# Patient Record
Sex: FEMALE | Race: OTHER | Employment: FULL TIME | URBAN - METROPOLITAN AREA
[De-identification: names, ages, dates, MRNs, and addresses within clinical notes are randomized per-mention and may not be internally consistent; named-entity substitution may affect disease eponyms.]

---

## 2022-09-12 LAB
ANTIBODY SCREEN, EXTERNAL: NEGATIVE
CHLAMYDIA, EXTERNAL: NEGATIVE
HBSAG, EXTERNAL: NEGATIVE
HEPATITIS C AB,   EXT: NEGATIVE
HIV, EXTERNAL: NON REACTIVE
N. GONORRHEA, EXTERNAL: NEGATIVE
RUBELLA, EXTERNAL: NORMAL
T. PALLIDUM, EXTERNAL: NON REACTIVE
TYPE, ABO & RH, EXTERNAL: NORMAL

## 2023-03-14 LAB — GRBS, EXTERNAL: NEGATIVE

## 2023-03-29 ENCOUNTER — HOSPITAL ENCOUNTER (INPATIENT)
Age: 28
LOS: 2 days | Discharge: HOME | End: 2023-03-31
Attending: STUDENT IN AN ORGANIZED HEALTH CARE EDUCATION/TRAINING PROGRAM | Admitting: STUDENT IN AN ORGANIZED HEALTH CARE EDUCATION/TRAINING PROGRAM
Payer: COMMERCIAL

## 2023-03-29 ENCOUNTER — ANESTHESIA EVENT (OUTPATIENT)
Dept: LABOR AND DELIVERY | Age: 28
End: 2023-03-29
Payer: COMMERCIAL

## 2023-03-29 ENCOUNTER — ANESTHESIA (OUTPATIENT)
Dept: LABOR AND DELIVERY | Age: 28
End: 2023-03-29
Payer: COMMERCIAL

## 2023-03-29 PROBLEM — Z34.90 TERM PREGNANCY: Status: ACTIVE | Noted: 2023-03-29

## 2023-03-29 LAB
ALBUMIN SERPL-MCNC: 2.8 G/DL (ref 3.5–5)
ALBUMIN/GLOB SERPL: 0.7 (ref 1.1–2.2)
ALP SERPL-CCNC: 155 U/L (ref 45–117)
ALT SERPL-CCNC: 18 U/L (ref 12–78)
AMPHET UR QL SCN: NEGATIVE
ANION GAP SERPL CALC-SCNC: 8 MMOL/L (ref 5–15)
AST SERPL-CCNC: 15 U/L (ref 15–37)
BARBITURATES UR QL SCN: NEGATIVE
BASOPHILS # BLD: 0 K/UL (ref 0–0.1)
BASOPHILS NFR BLD: 0 % (ref 0–1)
BENZODIAZ UR QL: NEGATIVE
BILIRUB SERPL-MCNC: 0.3 MG/DL (ref 0.2–1)
BUN SERPL-MCNC: 7 MG/DL (ref 6–20)
BUN/CREAT SERPL: 10 (ref 12–20)
CALCIUM SERPL-MCNC: 8.7 MG/DL (ref 8.5–10.1)
CANNABINOIDS UR QL SCN: NEGATIVE
CHLORIDE SERPL-SCNC: 109 MMOL/L (ref 97–108)
CO2 SERPL-SCNC: 20 MMOL/L (ref 21–32)
COCAINE UR QL SCN: NEGATIVE
CREAT SERPL-MCNC: 0.7 MG/DL (ref 0.55–1.02)
CREAT UR-MCNC: 78.9 MG/DL
DIFFERENTIAL METHOD BLD: ABNORMAL
DRUG SCRN COMMENT,DRGCM: NORMAL
EOSINOPHIL # BLD: 0 K/UL (ref 0–0.4)
EOSINOPHIL NFR BLD: 0 % (ref 0–7)
ERYTHROCYTE [DISTWIDTH] IN BLOOD BY AUTOMATED COUNT: 12.9 % (ref 11.5–14.5)
GLOBULIN SER CALC-MCNC: 3.9 G/DL (ref 2–4)
GLUCOSE SERPL-MCNC: 80 MG/DL (ref 65–100)
HCT VFR BLD AUTO: 35.3 % (ref 35–47)
HGB BLD-MCNC: 11.5 G/DL (ref 11.5–16)
IMM GRANULOCYTES # BLD AUTO: 0.1 K/UL (ref 0–0.04)
IMM GRANULOCYTES NFR BLD AUTO: 1 % (ref 0–0.5)
LYMPHOCYTES # BLD: 1.5 K/UL (ref 0.8–3.5)
LYMPHOCYTES NFR BLD: 12 % (ref 12–49)
MCH RBC QN AUTO: 26.3 PG (ref 26–34)
MCHC RBC AUTO-ENTMCNC: 32.6 G/DL (ref 30–36.5)
MCV RBC AUTO: 80.8 FL (ref 80–99)
METHADONE UR QL: NEGATIVE
MONOCYTES # BLD: 0.7 K/UL (ref 0–1)
MONOCYTES NFR BLD: 6 % (ref 5–13)
NEUTS SEG # BLD: 10.5 K/UL (ref 1.8–8)
NEUTS SEG NFR BLD: 82 % (ref 32–75)
NRBC # BLD: 0 K/UL (ref 0–0.01)
NRBC BLD-RTO: 0 PER 100 WBC
OPIATES UR QL: NEGATIVE
PCP UR QL: NEGATIVE
PLATELET # BLD AUTO: 144 K/UL (ref 150–400)
PMV BLD AUTO: 12.9 FL (ref 8.9–12.9)
POTASSIUM SERPL-SCNC: 4 MMOL/L (ref 3.5–5.1)
PROT SERPL-MCNC: 6.7 G/DL (ref 6.4–8.2)
PROT UR-MCNC: 34 MG/DL (ref 0–11.9)
PROT/CREAT UR-RTO: 0.4
RBC # BLD AUTO: 4.37 M/UL (ref 3.8–5.2)
SODIUM SERPL-SCNC: 137 MMOL/L (ref 136–145)
WBC # BLD AUTO: 12.9 K/UL (ref 3.6–11)

## 2023-03-29 PROCEDURE — 36415 COLL VENOUS BLD VENIPUNCTURE: CPT

## 2023-03-29 PROCEDURE — 75410000000 HC DELIVERY VAGINAL/SINGLE

## 2023-03-29 PROCEDURE — 77030014125 HC TY EPDRL BBMI -B: Performed by: ANESTHESIOLOGY

## 2023-03-29 PROCEDURE — 86920 COMPATIBILITY TEST SPIN: CPT

## 2023-03-29 PROCEDURE — 0KQM0ZZ REPAIR PERINEUM MUSCLE, OPEN APPROACH: ICD-10-PCS | Performed by: OBSTETRICS & GYNECOLOGY

## 2023-03-29 PROCEDURE — 74011000250 HC RX REV CODE- 250: Performed by: STUDENT IN AN ORGANIZED HEALTH CARE EDUCATION/TRAINING PROGRAM

## 2023-03-29 PROCEDURE — 74011250636 HC RX REV CODE- 250/636: Performed by: OBSTETRICS & GYNECOLOGY

## 2023-03-29 PROCEDURE — 74011250637 HC RX REV CODE- 250/637: Performed by: OBSTETRICS & GYNECOLOGY

## 2023-03-29 PROCEDURE — 85025 COMPLETE CBC W/AUTO DIFF WBC: CPT

## 2023-03-29 PROCEDURE — 75410000002 HC LABOR FEE PER 1 HR

## 2023-03-29 PROCEDURE — 86870 RBC ANTIBODY IDENTIFICATION: CPT

## 2023-03-29 PROCEDURE — 75410000003 HC RECOV DEL/VAG/CSECN EA 0.5 HR

## 2023-03-29 PROCEDURE — 65410000002 HC RM PRIVATE OB

## 2023-03-29 PROCEDURE — 80053 COMPREHEN METABOLIC PANEL: CPT

## 2023-03-29 PROCEDURE — 4A1HXCZ MONITORING OF PRODUCTS OF CONCEPTION, CARDIAC RATE, EXTERNAL APPROACH: ICD-10-PCS | Performed by: OBSTETRICS & GYNECOLOGY

## 2023-03-29 PROCEDURE — 76060000078 HC EPIDURAL ANESTHESIA

## 2023-03-29 PROCEDURE — 74011000250 HC RX REV CODE- 250: Performed by: ANESTHESIOLOGY

## 2023-03-29 PROCEDURE — 86900 BLOOD TYPING SEROLOGIC ABO: CPT

## 2023-03-29 PROCEDURE — 80307 DRUG TEST PRSMV CHEM ANLYZR: CPT

## 2023-03-29 PROCEDURE — 86921 COMPATIBILITY TEST INCUBATE: CPT

## 2023-03-29 PROCEDURE — 84156 ASSAY OF PROTEIN URINE: CPT

## 2023-03-29 PROCEDURE — 88307 TISSUE EXAM BY PATHOLOGIST: CPT

## 2023-03-29 PROCEDURE — 74011000250 HC RX REV CODE- 250

## 2023-03-29 PROCEDURE — 74011250636 HC RX REV CODE- 250/636: Performed by: STUDENT IN AN ORGANIZED HEALTH CARE EDUCATION/TRAINING PROGRAM

## 2023-03-29 PROCEDURE — 86644 CMV ANTIBODY: CPT

## 2023-03-29 PROCEDURE — 74011250636 HC RX REV CODE- 250/636: Performed by: ANESTHESIOLOGY

## 2023-03-29 PROCEDURE — 86922 COMPATIBILITY TEST ANTIGLOB: CPT

## 2023-03-29 RX ORDER — BUPIVACAINE HYDROCHLORIDE AND EPINEPHRINE 2.5; 5 MG/ML; UG/ML
INJECTION, SOLUTION EPIDURAL; INFILTRATION; INTRACAUDAL; PERINEURAL AS NEEDED
Status: DISCONTINUED | OUTPATIENT
Start: 2023-03-29 | End: 2023-03-29 | Stop reason: HOSPADM

## 2023-03-29 RX ORDER — FENTANYL CITRATE 50 UG/ML
INJECTION, SOLUTION INTRAMUSCULAR; INTRAVENOUS AS NEEDED
Status: DISCONTINUED | OUTPATIENT
Start: 2023-03-29 | End: 2023-03-29 | Stop reason: HOSPADM

## 2023-03-29 RX ORDER — MAG HYDROX/ALUMINUM HYD/SIMETH 200-200-20
30 SUSPENSION, ORAL (FINAL DOSE FORM) ORAL
Status: DISCONTINUED | OUTPATIENT
Start: 2023-03-29 | End: 2023-03-29 | Stop reason: HOSPADM

## 2023-03-29 RX ORDER — OXYTOCIN/RINGER'S LACTATE 30/500 ML
10 PLASTIC BAG, INJECTION (ML) INTRAVENOUS AS NEEDED
Status: COMPLETED | OUTPATIENT
Start: 2023-03-29 | End: 2023-03-29

## 2023-03-29 RX ORDER — OXYCODONE AND ACETAMINOPHEN 5; 325 MG/1; MG/1
2 TABLET ORAL
Status: DISCONTINUED | OUTPATIENT
Start: 2023-03-29 | End: 2023-03-31 | Stop reason: HOSPADM

## 2023-03-29 RX ORDER — NALOXONE HYDROCHLORIDE 0.4 MG/ML
0.4 INJECTION, SOLUTION INTRAMUSCULAR; INTRAVENOUS; SUBCUTANEOUS AS NEEDED
Status: DISCONTINUED | OUTPATIENT
Start: 2023-03-29 | End: 2023-03-31 | Stop reason: HOSPADM

## 2023-03-29 RX ORDER — OXYTOCIN/RINGER'S LACTATE 30/500 ML
10 PLASTIC BAG, INJECTION (ML) INTRAVENOUS AS NEEDED
Status: DISCONTINUED | OUTPATIENT
Start: 2023-03-29 | End: 2023-03-31 | Stop reason: HOSPADM

## 2023-03-29 RX ORDER — FENTANYL/BUPIVACAINE/NS/PF 2-1250MCG
SYRINGE (ML) EPIDURAL
Status: COMPLETED
Start: 2023-03-29 | End: 2023-03-29

## 2023-03-29 RX ORDER — ONDANSETRON 2 MG/ML
4 INJECTION INTRAMUSCULAR; INTRAVENOUS
Status: DISCONTINUED | OUTPATIENT
Start: 2023-03-29 | End: 2023-03-29 | Stop reason: HOSPADM

## 2023-03-29 RX ORDER — ONDANSETRON 4 MG/1
4 TABLET, ORALLY DISINTEGRATING ORAL
Status: ACTIVE | OUTPATIENT
Start: 2023-03-29 | End: 2023-03-30

## 2023-03-29 RX ORDER — OXYTOCIN/RINGER'S LACTATE 30/500 ML
87.3 PLASTIC BAG, INJECTION (ML) INTRAVENOUS AS NEEDED
Status: DISCONTINUED | OUTPATIENT
Start: 2023-03-29 | End: 2023-03-31 | Stop reason: HOSPADM

## 2023-03-29 RX ORDER — FENTANYL/BUPIVACAINE/NS/PF 2-1250MCG
1-16 SYRINGE (ML) EPIDURAL CONTINUOUS
Status: DISCONTINUED | OUTPATIENT
Start: 2023-03-29 | End: 2023-03-29 | Stop reason: HOSPADM

## 2023-03-29 RX ORDER — ZOLPIDEM TARTRATE 5 MG/1
5 TABLET ORAL
Status: DISCONTINUED | OUTPATIENT
Start: 2023-03-29 | End: 2023-03-31 | Stop reason: HOSPADM

## 2023-03-29 RX ORDER — SODIUM CHLORIDE 0.9 % (FLUSH) 0.9 %
5-40 SYRINGE (ML) INJECTION EVERY 8 HOURS
Status: DISCONTINUED | OUTPATIENT
Start: 2023-03-29 | End: 2023-03-31 | Stop reason: HOSPADM

## 2023-03-29 RX ORDER — SODIUM CHLORIDE 0.9 % (FLUSH) 0.9 %
5-40 SYRINGE (ML) INJECTION EVERY 8 HOURS
Status: DISCONTINUED | OUTPATIENT
Start: 2023-03-29 | End: 2023-03-29 | Stop reason: HOSPADM

## 2023-03-29 RX ORDER — SODIUM CHLORIDE 0.9 % (FLUSH) 0.9 %
5-40 SYRINGE (ML) INJECTION AS NEEDED
Status: DISCONTINUED | OUTPATIENT
Start: 2023-03-29 | End: 2023-03-29 | Stop reason: HOSPADM

## 2023-03-29 RX ORDER — DIPHENHYDRAMINE HCL 25 MG
25 CAPSULE ORAL
Status: DISCONTINUED | OUTPATIENT
Start: 2023-03-29 | End: 2023-03-31 | Stop reason: HOSPADM

## 2023-03-29 RX ORDER — IBUPROFEN 400 MG/1
800 TABLET ORAL EVERY 8 HOURS
Status: DISCONTINUED | OUTPATIENT
Start: 2023-03-29 | End: 2023-03-31 | Stop reason: HOSPADM

## 2023-03-29 RX ORDER — ACETAMINOPHEN 325 MG/1
650 TABLET ORAL
Status: DISCONTINUED | OUTPATIENT
Start: 2023-03-29 | End: 2023-03-31 | Stop reason: HOSPADM

## 2023-03-29 RX ORDER — SIMETHICONE 80 MG
80 TABLET,CHEWABLE ORAL
Status: DISCONTINUED | OUTPATIENT
Start: 2023-03-29 | End: 2023-03-31 | Stop reason: HOSPADM

## 2023-03-29 RX ORDER — HYDROCORTISONE ACETATE PRAMOXINE HCL 2.5; 1 G/100G; G/100G
CREAM TOPICAL AS NEEDED
Status: DISCONTINUED | OUTPATIENT
Start: 2023-03-29 | End: 2023-03-31 | Stop reason: HOSPADM

## 2023-03-29 RX ORDER — BUPIVACAINE HYDROCHLORIDE AND EPINEPHRINE 2.5; 5 MG/ML; UG/ML
INJECTION, SOLUTION EPIDURAL; INFILTRATION; INTRACAUDAL; PERINEURAL
Status: COMPLETED
Start: 2023-03-29 | End: 2023-03-29

## 2023-03-29 RX ORDER — ADHESIVE BANDAGE
30 BANDAGE TOPICAL DAILY PRN
Status: DISCONTINUED | OUTPATIENT
Start: 2023-03-29 | End: 2023-03-31 | Stop reason: HOSPADM

## 2023-03-29 RX ORDER — SODIUM CHLORIDE 0.9 % (FLUSH) 0.9 %
5-40 SYRINGE (ML) INJECTION AS NEEDED
Status: DISCONTINUED | OUTPATIENT
Start: 2023-03-29 | End: 2023-03-31 | Stop reason: HOSPADM

## 2023-03-29 RX ORDER — ACETAMINOPHEN 325 MG/1
650 TABLET ORAL
Status: DISCONTINUED | OUTPATIENT
Start: 2023-03-29 | End: 2023-03-29 | Stop reason: HOSPADM

## 2023-03-29 RX ORDER — SODIUM CHLORIDE, SODIUM LACTATE, POTASSIUM CHLORIDE, CALCIUM CHLORIDE 600; 310; 30; 20 MG/100ML; MG/100ML; MG/100ML; MG/100ML
125 INJECTION, SOLUTION INTRAVENOUS CONTINUOUS
Status: DISCONTINUED | OUTPATIENT
Start: 2023-03-29 | End: 2023-03-31 | Stop reason: HOSPADM

## 2023-03-29 RX ADMIN — SODIUM CHLORIDE, POTASSIUM CHLORIDE, SODIUM LACTATE AND CALCIUM CHLORIDE 999 ML/HR: 600; 310; 30; 20 INJECTION, SOLUTION INTRAVENOUS at 10:24

## 2023-03-29 RX ADMIN — SODIUM CHLORIDE, POTASSIUM CHLORIDE, SODIUM LACTATE AND CALCIUM CHLORIDE 125 ML/HR: 600; 310; 30; 20 INJECTION, SOLUTION INTRAVENOUS at 11:50

## 2023-03-29 RX ADMIN — BUPIVACAINE HYDROCHLORIDE AND EPINEPHRINE 0.5 ML: 2.5; 5 INJECTION, SOLUTION EPIDURAL; INFILTRATION; INTRACAUDAL; PERINEURAL at 12:14

## 2023-03-29 RX ADMIN — FENTANYL CITRATE 100 MCG: 50 INJECTION, SOLUTION INTRAMUSCULAR; INTRAVENOUS at 12:16

## 2023-03-29 RX ADMIN — SODIUM CHLORIDE, PRESERVATIVE FREE 10 ML: 5 INJECTION INTRAVENOUS at 14:00

## 2023-03-29 RX ADMIN — IBUPROFEN 800 MG: 400 TABLET ORAL at 22:52

## 2023-03-29 RX ADMIN — Medication 12 ML/HR: at 12:20

## 2023-03-29 RX ADMIN — Medication 12 ML/HR: at 18:44

## 2023-03-29 RX ADMIN — ACETAMINOPHEN 650 MG: 325 TABLET ORAL at 22:52

## 2023-03-29 RX ADMIN — BUPIVACAINE HYDROCHLORIDE AND EPINEPHRINE 3 ML: 2.5; 5 INJECTION, SOLUTION EPIDURAL; INFILTRATION; INTRACAUDAL; PERINEURAL at 12:16

## 2023-03-29 RX ADMIN — OXYTOCIN 10000 MILLI-UNITS: 10 INJECTION INTRAVENOUS at 19:28

## 2023-03-29 RX ADMIN — BUPIVACAINE HYDROCHLORIDE AND EPINEPHRINE 3 ML: 2.5; 5 INJECTION, SOLUTION EPIDURAL; INFILTRATION; INTRACAUDAL; PERINEURAL at 12:15

## 2023-03-29 NOTE — PROGRESS NOTES
Summary: 28 y.o.  at 38w6d admitted for term labor, noted to have Pre-E w/o SF on admission. Subjective: Feeling more rectal pressure. No longer feeling contractions. Objective:   Visit Vitals  BP (!) 127/59   Pulse 93   Temp 99.1 °F (37.3 °C)   Resp 18   Ht 5' 6\" (1.676 m)   Wt 98.4 kg (217 lb)   SpO2 99%   Breastfeeding No   BMI 35.02 kg/m²       Gen: NAD  CV: regular rate  Resp: Non labored respirations  Abd: Gravid  Ext: Trace edema     CE: Cervical Exam  Dilation (cm):9  Eff: 90 %  Station: 0  Vaginal exam done by? : Dr. Sukumar Henderson  Membrane Status: AROM    NST: Cat II FHR  150/mod variability/no accels/ early decels       A&P:  28 y.o.  at 38w6d admitted for term labor noted to have Pre-E w/o SF.      Term Labor:   -Augmented with amniotomy given Pre-E w/o SF  -CE /0, anticipate vaginal delivery   -IUPC/FSE placed s/p amnioinfusion  -Intermittent Cat II tracing, improvement with position changes       Pre-Eclampsia:   -CHTN diagnosed around 20wks, no medications  -Severe range Bps intermittently here- if persistent or require treatment will start magnesium ppx   -Hgb 11.6, Plts 144, Cr .7, ALT 18, AST 15, P/C .4  -Discussed indications for magnesium ppx with patient     Sammi Officer, MD  Obstetrics and Gynecology   [facility]

## 2023-03-29 NOTE — PROGRESS NOTES
Delivery Summary  Patient: Meseret Mancilla             Additional Delivery Comments - Patient was admitted early the afternoon prior to delivery with onset of contractions of gradually increasing strength and frequency. On admission, she was 5 cm. AROM was completed with return of meconium. Epidural analgesia was placed. Fetal heart tones were Cat 1 and Cat 2 throughout the course of labor. The patient then progressed through the first stage, then progressed through the second stage to a vaginal delivery. When the fetal vertex approached the perinuem with maternal pushing efforts, the patient was prepared for delivery. The baby was delivered without difficulty over intact perineum, bulb suctioned, became active and crying, and was placed on the maternal abdomen. The cord was clamped and cut, and then the placenta delivered spontaneously, intact. The fundus became firm, the cervix and sulci were inspected and appeared to be intact. A second degree perineal laceration with left sulcus extension was repaired with 3-0 Vicryl suture in layers. Vaginal exam revealed no evidence of hematoma formation or foreign bodies. Sponge and sharps count was correct. The procedure was tolerated adequately by the patient and she is recovering in stable condition. Information for the patient's : Curtis Veras [[id number]]     Delivery Type: Vaginal, Spontaneous   Delivery Date: 3/29/2023   Delivery Time: 7:26 PM     Birth Weight: 3.26 kg     Sex:  male  Delivery Clinician:  Sharrell Jeans   Gestational Age: 38w7d    Presentation: Vertex   Position: Left  Occiput  Anterior     Apgars were 9  and 9      Resuscitation Method: Suctioning-bulb; Tactile Stimulation     Meconium Stained: Thick    Living Status: Living       Placenta Date/Time:     Placenta Removal: Spontaneous   Placenta Appearance: Intact; Other (comment)    Cord Information: 3 Vessels    Cord Events: None       Disposition of Cord Blood: Lab Blood Gases Sent?:  No     Episiotomy: None   Laceration(s): 2nd     Estimated Blood Loss (ml): No data found    Labor Events  Method: None      Augmentation: AROM   Cervical Ripening:     None

## 2023-03-29 NOTE — ANESTHESIA PREPROCEDURE EVALUATION
Relevant Problems   No relevant active problems       Anesthetic History   No history of anesthetic complications            Review of Systems / Medical History  Patient summary reviewed, nursing notes reviewed and pertinent labs reviewed    Pulmonary  Within defined limits                 Neuro/Psych   Within defined limits           Cardiovascular    Hypertension              Exercise tolerance: >4 METS     GI/Hepatic/Renal                Endo/Other        Obesity    Comments:  Thrombocytopenia (Platelets = 918W on 6/48/61) Other Findings   Comments: IUP         Physical Exam    Airway  Mallampati: II  TM Distance: > 6 cm  Neck ROM: normal range of motion   Mouth opening: Normal     Cardiovascular  Regular rate and rhythm,  S1 and S2 normal,  no murmur, click, rub, or gallop             Dental  No notable dental hx       Pulmonary  Breath sounds clear to auscultation               Abdominal  GI exam deferred       Other Findings            Anesthetic Plan    ASA: 2  Anesthesia type: CSE            Anesthetic plan and risks discussed with: Patient

## 2023-03-29 NOTE — PROGRESS NOTES
This RN agrees with the charting completed by STACIA Duke, [id number] [facility]: Joan Laguna is a 28 y.o.   at 38w6d patient of Dr Ariana Cheng at [facility] who presents to L&D with c/o contractions. She reports Positive FM, denies vaginal bleeding and LOF. She also denies Headaches, Scotoma, and RUQ pain. Urine sample obtained. EFM and toco placed for initial assessment. 1036: RN spoke with Dr. Elliott Tom of pt BP. Pt is currently prepping for an epidural. MD stated she will revaluate after epidural placement     1040: Dr. Julian Dick at bedside for evaluation. US completed by MD     [id number]: Anesthesia at bedside at 1209. Patient positioned to side of the bed, EFM & TOCO adjusted to accommodate sterile field. Difficulty tracing due to maternal position. Time out completed at 1210. Successful epidural is completed by Dr. Olga Marino Patient is repositioned supine in bed with wedge under left hip. EFM and TOCO replaced and blood pressure frequency increased. Scott catheter placed and epidural continuous infusion is started. 1318: Dr. Julian Dick at bedside for evaluation. SVE performed by MD. Internals placed and amnioinfusion started 75ml/hr 250 ml total. MD requests NICU at delivery      1330: Dr. Julian Dick at bedside for evaluation. Verbal orders given to bolus 250ml NS for amnoinfusion     1336: Dr. Julian Dick at bedside for evaluation. Verbal orders given to bolus 500 ml NS for amnoinfusion     1449: Dr. Julian Dick at bedside for evaluation. 1514: Dr. Julian Dick at bedside for evaluation. 1658: Dr. Julian Dick at bedside for evaluation. 181: Dr. Debbie Wilcox at bedside for evaluation     181: Patient begins pushing. RN remains in continuous attendance at the bedside. Assessment & evaluation of fetal heart rate ongoing via continuous EFM. 184: Dr. Debbie Wilcox at bedside for evaluation     190: Dr. Debbie Wilcox at bedside for evaluation     190: Bedside and Verbal shift change report given to CJ Cornejo RN (oncoming nurse) by EVITA Gardner RN (offgoing nurse). Report included the following information SBAR and Kardex.

## 2023-03-29 NOTE — H&P
History & Physical    Name: Tha Prince MRN: [id number]  SSN: [id number]    YOB: 1995  Age: 28 y.o. Sex: female        Subjective:     Estimated Date of Delivery: 23  OB History    Para Term  AB Living   1             SAB IAB Ectopic Molar Multiple Live Births                    # Outcome Date GA Lbr Conrad/2nd Weight Sex Delivery Anes PTL Lv   1 Current                Tha Prince is a 28 y.o.  at 38w6d presenting with contractions. Contractions began around 0230 this AM and became stronger and more frequent. Having some spotting, no bleeding or passage of clots. Feels good FM and has not had any leakage of fluid. Was seen in the office this morning and noted to be 4/90 from prior exam of 1cm. Denies HA, vision changes, RUQ pain. Does have some worsening edema. Has been checking Bps at home and they were in the 150s at home. Prenatal record reviewed from 22-3/29/23    Pregnancy Problems:   -CHTN: no medications   -Rh neg: s/p rhogam at 28wks   -Anemia     Past Medical History:   Diagnosis Date    Essential hypertension     chronic HTN     History reviewed. No pertinent surgical history. Social History     Occupational History    Occupation:    Tobacco Use    Smoking status: Never    Smokeless tobacco: Never   Vaping Use    Vaping Use: Never used   Substance and Sexual Activity    Alcohol use: Not Currently    Drug use: Never    Sexual activity: Not on file     History reviewed. No pertinent family history. No Known Allergies  Prior to Admission medications    Medication Sig Start Date End Date Taking? Authorizing Provider   PNV Comb #2-Iron-FA-Omega 3 29-1-400 mg cmpk Take  by mouth. Yes Provider, Historical        Review of Systems: A comprehensive review of systems was negative except for that written in the HPI.     Objective:     Vitals:  Vitals:    23 1049 23 1051 23 1104 23 1106   BP:  (!) 168/84  (!) 155/77   Pulse: 83  96   Resp:       Temp:       SpO2: 100%  100%    Weight:       Height:          Physical Exam:  Gen: NAD  Resp: Non labored resp  CV: regular rate  Abd: Gravid  Ext: Trace edema     CE: /-1, AROM with thick meconium       CEFM:Cat 1 FHR tracins, mod colt, + accels, -decels     TAUS: vertex presentation confirmed           Prenatal Labs:   Lab Results   Component Value Date/Time    Rubella, External immune 2022 12:00 AM    GrBStrep, External negative 2023 12:00 AM    HBsAg, External negative 2022 12:00 AM    HIV, External non reactive 2022 12:00 AM    Gonorrhea, External negative 2022 12:00 AM    Chlamydia, External negative 2022 12:00 AM    ABO,Rh A negative 2022 12:00 AM         Assessment/Plan:     Active Problems:    Term pregnancy (3/29/2023)     29 yo  at 38w6d presenting in term labor with  Pre- Eclampsia.      Plan:  Term Labor:   -CE: -1  -Now s/p epidural and amniotomy with thick meconium   -GBS neg  -Reassuring fetal surviellance   -EFW 3/22/23 3475g, AC 95%ile     Pre-Eclampsia:   -CHTN diagnosed around 20wks, no medications  -Severe range Bps intermittently here- if persistent or require treatment will start magnesium ppx   -Hgb 11.6, Plts 144, Cr .7, ALT 18, AST 15, P/C .4  -Discussed indications for magnesium ppx with patient     RH neg:   -Will assess fetal blood type post partum    Anemia:   -Hgb 11.5 on admission     Norbert Negron MD  Obstetrics and Gynecology   [facility]

## 2023-03-29 NOTE — ANESTHESIA PROCEDURE NOTES
CSE Block    Start time: 3/29/2023 12:10 PM  End time: 3/29/2023 12:18 PM  Performed by: Justin Da Silva MD  Authorized by: Justin Da Silva MD     Pre-Procedure  Indications: at surgeon's request and procedure for pain    preanesthetic checklist: patient identified, risks and benefits discussed, anesthesia consent, site marked, patient being monitored and timeout performed    Timeout Time: 12:10 EDT      Procedure:   Patient Position:  Seated  Prep Region:  Lumbar  Prep: DuraPrep    Location:  L3-4    Epidural Needle:   Needle Type:  Tuohy  Needle Gauge:  17 G  Injection Technique:  Loss of resistance using saline  Attempts:  1    Spinal Needle:   Needle Type:   Angel Luis  Needle Gauge:  25 G    Catheter:   Catheter Type:  Flex-tip  Catheter Size:  19 G  Catheter at Skin Depth (cm):  10.5  Depth in Epidural Space (cm):  4  Events: no blood with aspiration, no cerebrospinal fluid with aspiration, no paresthesia and negative aspiration test    Med Admin time: 3/29/2023 12:14 PM    Assessment:   Catheter Secured:  Tegaderm and tape  Insertion:  Uncomplicated  Patient tolerance:  Patient tolerated the procedure well with no immediate complications

## 2023-03-29 NOTE — PROGRESS NOTES
Summary: 28 y.o.  at 38w6d admitted for term labor, noted to have Pre-E w/o SF on admission. Subjective: Intermittently feeling pressure. Pain on L side in lower back. Objective:   Visit Vitals  BP (!) 142/85   Pulse 94   Temp 99.1 °F (37.3 °C)   Resp 18   Ht 5' 6\" (1.676 m)   Wt 98.4 kg (217 lb)   SpO2 98%   Breastfeeding No   BMI 35.02 kg/m²       Gen: NAD  CV: regular rate  Resp: Non labored respirations  Abd: Gravid  Ext: Trace edema     CE: Cervical Exam  Dilation (cm): 7  Eff: 90 %  Station: 0  Vaginal exam done by? : Dr. Ayush Garrison  Membrane Status: AROM    NST: Cat II FHR  150/mod variability/no accels/ early decels       A&P:  28 y.o.  at 38w6d admitted for term labor noted to have Pre-E w/o SF.      Term Labor:   -Augmented with amniotomy given Pre-E w/o SF  -CE /0  -IUPC/FSE placed s/p amnioinfusion  -Patient with early decelerations making cervical change, will continue to reposition        Pre-Eclampsia:   -CHTN diagnosed around 20wks, no medications  -Severe range Bps intermittently here- if persistent or require treatment will start magnesium ppx   -Hgb 11.6, Plts 144, Cr .7, ALT 18, AST 15, P/C .4  -Discussed indications for magnesium ppx with patient     Dalia Marino MD  Obstetrics and Gynecology   [facility]

## 2023-03-29 NOTE — PROGRESS NOTES
1422- Amniofusion bolus completed, VORB from Dr. David Flaherty is to pause and hold any added fluid going in. Competed.

## 2023-03-29 NOTE — PROGRESS NOTES
Anesthesia at bedside at ***. Patient positioned to side of the bed, EFM & TOCO adjusted to accommodate sterile field. Difficulty tracing due to maternal position. Time out completed at ***. Successful epidural is completed by  ***. Patient is repositioned supine in bed with wedge under {left/right:405329} hip. EFM and TOCO replaced and blood pressure frequency increased. Scott catheter placed and epidural continuous infusion is started.

## 2023-03-29 NOTE — PROGRESS NOTES
Summary: 28 y.o.  at 38w6d admitted for term labor, noted to have Pre-E w/o SF on admission. Subjective: More comfortable with epidural. Only feeling some contractions         Objective:   Visit Vitals  BP (!) 114/59   Pulse 90   Temp 98.2 °F (36.8 °C)   Resp 18   Ht 5' 6\" (1.676 m)   Wt 98.4 kg (217 lb)   SpO2 99%   Breastfeeding No   BMI 35.02 kg/m²       Gen: NAD  CV: regular rate  Resp: Non labored respirations  Abd: Gravid  Ext: Trace edema     CE: Cervical Exam  Dilation (cm): 5  Eff: 90 %  Vaginal exam done by? : Dr. Lois Couch Status: AROM    NST: Cat II FHR  150/mod variability/pos accels/ variable decels       A&P:  28 y.o.  at 38w6d admitted for term labor noted to have Pre-E w/o SF.      Term Labor:   -Augmented with amniotomy given Pre-E w/o SF  -Now with variable decelerations that are not responding to position changes  -IUPC/FSE placed and amnioinfusion initiated   -Repositioned to R lateral in trendelenburg with improvement in tracing       Pre-Eclampsia:   -CHTN diagnosed around 20wks, no medications  -Severe range Bps intermittently here- if persistent or require treatment will start magnesium ppx   -Hgb 11.6, Plts 144, Cr .7, ALT 18, AST 15, P/C .4  -Discussed indications for magnesium ppx with patient     Steven Lau MD  Obstetrics and Gynecology   [facility] [place]

## 2023-03-30 PROCEDURE — 65410000002 HC RM PRIVATE OB

## 2023-03-30 PROCEDURE — 74011250637 HC RX REV CODE- 250/637: Performed by: OBSTETRICS & GYNECOLOGY

## 2023-03-30 PROCEDURE — 74011000250 HC RX REV CODE- 250: Performed by: STUDENT IN AN ORGANIZED HEALTH CARE EDUCATION/TRAINING PROGRAM

## 2023-03-30 RX ADMIN — SODIUM CHLORIDE, PRESERVATIVE FREE 10 ML: 5 INJECTION INTRAVENOUS at 12:11

## 2023-03-30 RX ADMIN — ACETAMINOPHEN 650 MG: 325 TABLET ORAL at 06:40

## 2023-03-30 RX ADMIN — IBUPROFEN 800 MG: 400 TABLET ORAL at 13:51

## 2023-03-30 RX ADMIN — IBUPROFEN 800 MG: 400 TABLET ORAL at 06:40

## 2023-03-30 RX ADMIN — ACETAMINOPHEN 650 MG: 325 TABLET ORAL at 10:52

## 2023-03-30 NOTE — ROUTINE PROCESS
Bedside and Verbal shift change report given to JA Fiore RN (oncoming nurse) by RHONDA Bui RN (offgoing nurse). Report included the following information SBAR, Kardex, Intake/Output, and MAR.

## 2023-03-30 NOTE — LACTATION NOTE
This note was copied from a baby's chart. 23 7115   Visit Information   Lactation Consult Visit Type IP Consult Follow Up   Visit Length 30 minutes   Referral Received From Lactation Consultant Follow-up   Reason for Visit Normal Dickinson Visit; Latch Problems;Education   Breast- Feeding Assessment   Breast-Feeding Experience No  Equipment: [employer], [employer] and [employer] breast pump; Measured for a 27mm flanges on the left breast and a 21-23mm on the right   Breast Assessment   Left Breast Medium   Left Nipple Short   Right Breast Medium   Right Nipple Short   Mother/Infant Observation   Mother Observation Breast comfortable;Close hold;Recognizes feeding cues   Infant Observation Feeding cues; Frenulum checked; Latches nipple and aereolae;Lips flanged, lower; Lips flanged, upper;Opens mouth  (Chin slightly recessed)   LATCH Documentation   Latch 1   Audible Swallowing 1   Type of Nipple 2   Comfort (Breast/Nipple) 2   Hold (Positioning) 1   LATCH Score 7     Mother's left nipple is larger than the right. Baby is having difficulty on the left and latch not achieved. Observed baby latch on the right breast. Recommended to mother to offer the right breast, pump both breasts and supplement baby with EBM. Discussed the availability of donor milk if baby should show signs of inadequate intake. Mother would prefer donor milk if supplementation became necessary and consent was obtained. Mother's questions were addressed. Plan:  Offer lots of skin to skin and access to the breast.  Feed baby at early signs of hunger every 2-3 hours. Assure a deep latch, check that baby's lips are turned outward and use breast compression to keep baby actively feeding. Pump/hand express  and offer baby EBM. Supplement with donor milk for signs of inadequate intake. Monitor wet and dirty diapers for signs of adequate intake. Follow instructions for managing engorgement.

## 2023-03-30 NOTE — LACTATION NOTE
This note was copied from a baby's chart. 23 1115   Visit Information   Lactation Consult Visit Type IP Initial Consult   Visit Length 45 minutes   Reason for Visit Normal Boron Visit; Latch Problems;Education   Breast- Feeding Assessment   Breast-Feeding Experience No  Equipment: [employer] Cornejo and [employer] breast pump; Measured for a 27mm flange on the left and a 21-23mm on the right   Breast Assessment   Left Breast Medium  (Colostrum expressed)   Left Nipple Short  (Oval shape)   Right Breast Medium  (Colostrum expressed)   Right Nipple Short   Mother/Infant Observation   Infant Observation Chin slightly recessed     Reviewed the \"Your Guide to Breastfeeding\" booklet. Discussed the typical feeding characteristics in the 1st and 2nd DOL and signs of adequate intake. Demonstrated hand expression and the asymmetric latch. Baby having difficulty latching. Initiated pumping. Discussed a feeding plan and mother's questions were addressed. Will return for next feeding. Plan:  Offer lots of skin to skin and access to the breast.  Feed baby at early signs of hunger every 2-3 hours. Assure a deep latch, check that baby's lips are turned outward and use breast compression to keep baby actively feeding. Pump/hand after breastfeeding and offer baby EBM. Monitor wet and dirty diapers for signs of adequate intake. Follow instructions for managing engorgement.

## 2023-03-30 NOTE — PROGRESS NOTES
9122 - Got verbal orders from [facility] MD Kendrick to monitor pt's vitals Q4H to monitor BP closely since pt has hx of CHTN but is not on any BP medications     This RN will reassess pt's BP at noon

## 2023-03-30 NOTE — PROGRESS NOTES
Post-Partum Day Number 1 Progress Note    Tatianna Barnett     Assessment: Doing well, post partum day 1    Plan:  - Continue routine postpartum and perineal care as well as maternal education.  - The risks and benefits of the circumcision  procedure and anesthesia including: bleeding, infection, variability of cosmetic results were discussed at length with the mother. She is aware that future repeat procedures may be necessary. She gives informed consent to proceed as noted and her questions are answered. - preE without SF - bps improved after delivery until this am 140s/80s. Will monitor today and start antihypertensives if persistently elevated. - rh neg - rhogam studies.  - Plan discharge home [id number] Discharge Date: Tomorrow. Information for the patient's : Thais Barrios [[id number]]   Vaginal, Spontaneous  Patient doing well without significant complaint. Voiding without difficulty, normal lochia. Vitals:  Visit Vitals  BP (!) 140/87   Pulse 92   Temp 98.1 °F (36.7 °C)   Resp 17   Ht 5' 6\" (1.676 m)   Wt 98.4 kg (217 lb)   SpO2 99%   Breastfeeding Unknown   BMI 35.02 kg/m²     Temp (24hrs), Av.4 °F (36.9 °C), Min:97.5 °F (36.4 °C), Max:99.1 °F (37.3 °C)        Exam:   Patient without distress. Fundus firm, nontender per nursing fundal checks. Perineum with normal lochia noted per nursing assessment. Lower extremities are negative for pathological edema.     Labs:     Lab Results   Component Value Date/Time    WBC 12.9 (H) 2023 10:28 AM    HGB 11.5 2023 10:28 AM    HCT 35.3 2023 10:28 AM    PLATELET 913 (L) 10/06/2001 10:28 AM       Recent Results (from the past 24 hour(s))   CBC WITH AUTOMATED DIFF    Collection Time: 23 10:28 AM   Result Value Ref Range    WBC 12.9 (H) 3.6 - 11.0 K/uL    RBC 4.37 3.80 - 5.20 M/uL    HGB 11.5 11.5 - 16.0 g/dL    HCT 35.3 35.0 - 47.0 %    MCV 80.8 80.0 - 99.0 FL    MCH 26.3 26.0 - 34.0 PG MCHC 32.6 30.0 - 36.5 g/dL    RDW 12.9 11.5 - 14.5 %    PLATELET 595 (L) 153 - 400 K/uL    MPV 12.9 8.9 - 12.9 FL    NRBC 0.0 0  WBC    ABSOLUTE NRBC 0.00 0.00 - 0.01 K/uL    NEUTROPHILS 82 (H) 32 - 75 %    LYMPHOCYTES 12 12 - 49 %    MONOCYTES 6 5 - 13 %    EOSINOPHILS 0 0 - 7 %    BASOPHILS 0 0 - 1 %    IMMATURE GRANULOCYTES 1 (H) 0.0 - 0.5 %    ABS. NEUTROPHILS 10.5 (H) 1.8 - 8.0 K/UL    ABS. LYMPHOCYTES 1.5 0.8 - 3.5 K/UL    ABS. MONOCYTES 0.7 0.0 - 1.0 K/UL    ABS. EOSINOPHILS 0.0 0.0 - 0.4 K/UL    ABS. BASOPHILS 0.0 0.0 - 0.1 K/UL    ABS. IMM.  GRANS. 0.1 (H) 0.00 - 0.04 K/UL    DF AUTOMATED     TYPE & SCREEN    Collection Time: 03/29/23 10:32 AM   Result Value Ref Range    Crossmatch Expiration 04/01/2023,2359     ABO/Rh(D) A NEGATIVE     Antibody screen POS     Antibody ID Anti-D passively acquired     Unit number [id number]     Blood component type  LR,2     Unit division 00     Status of unit ALLOCATED     Crossmatch result Compatible     Unit number [id number]     Blood component type  LR,2     Unit division 00     Status of unit ALLOCATED     Crossmatch result Compatible    DRUG SCREEN, URINE    Collection Time: 03/29/23 10:32 AM   Result Value Ref Range    AMPHETAMINES Negative NEG      BARBITURATES Negative NEG      BENZODIAZEPINES Negative NEG      COCAINE Negative NEG      METHADONE Negative NEG      OPIATES Negative NEG      PCP(PHENCYCLIDINE) Negative NEG      THC (TH-CANNABINOL) Negative NEG      Drug screen comment (NOTE)    METABOLIC PANEL, COMPREHENSIVE    Collection Time: 03/29/23 10:47 AM   Result Value Ref Range    Sodium 137 136 - 145 mmol/L    Potassium 4.0 3.5 - 5.1 mmol/L    Chloride 109 (H) 97 - 108 mmol/L    CO2 20 (L) 21 - 32 mmol/L    Anion gap 8 5 - 15 mmol/L    Glucose 80 65 - 100 mg/dL    BUN 7 6 - 20 MG/DL    Creatinine 0.70 0.55 - 1.02 MG/DL    BUN/Creatinine ratio 10 (L) 12 - 20      eGFR >60 >60 ml/min/1.73m2    Calcium 8.7 8.5 - 10.1 MG/DL    Bilirubin, total 0.3 0.2 - 1.0 MG/DL    ALT (SGPT) 18 12 - 78 U/L    AST (SGOT) 15 15 - 37 U/L    Alk. phosphatase 155 (H) 45 - 117 U/L    Protein, total 6.7 6.4 - 8.2 g/dL    Albumin 2.8 (L) 3.5 - 5.0 g/dL    Globulin 3.9 2.0 - 4.0 g/dL    A-G Ratio 0.7 (L) 1.1 - 2.2     PROTEIN/CREATININE RATIO, URINE    Collection Time: 03/29/23 11:30 AM   Result Value Ref Range    Protein, urine random 34 (H) 0.0 - 11.9 mg/dL    Creatinine, urine random 78.90 mg/dL    Protein/Creat.  urine Ratio 0.4

## 2023-03-30 NOTE — PROGRESS NOTES
190. Bedside shift change report given to CJ Wild RN (oncoming nurse) by EVITA Gardner RN (offgoing nurse). Report included the following information SBAR, Kardex, Intake/Output, MAR, and Recent Results. . Dr. Teresa Simpson at bedside for delivery. . NICU at bedside. . RN remained at bedside throughout pushing. EFM continuously assessed.  of live baby boy by Dr. Sushila Joseph. Post partum care started at this time. 715. Bedside shift change report given to RHONDA Milner RN (oncoming nurse) by CJ Wild RN (offgoing nurse). Report included the following information SBAR, Kardex, Intake/Output, MAR, and Recent Results.

## 2023-03-31 VITALS
BODY MASS INDEX: 34.87 KG/M2 | HEIGHT: 66 IN | OXYGEN SATURATION: 99 % | TEMPERATURE: 99.1 F | RESPIRATION RATE: 16 BRPM | WEIGHT: 217 LBS | DIASTOLIC BLOOD PRESSURE: 72 MMHG | SYSTOLIC BLOOD PRESSURE: 141 MMHG | HEART RATE: 92 BPM

## 2023-03-31 LAB
ABO + RH BLD: NORMAL
BLD PROD TYP BPU: NORMAL
BLD PROD TYP BPU: NORMAL
BLOOD GROUP ANTIBODIES SERPL: NORMAL
BLOOD GROUP ANTIBODIES SERPL: NORMAL
BPU ID: NORMAL
BPU ID: NORMAL
CROSSMATCH RESULT,%XM: NORMAL
CROSSMATCH RESULT,%XM: NORMAL
SPECIMEN EXP DATE BLD: NORMAL
STATUS OF UNIT,%ST: NORMAL
STATUS OF UNIT,%ST: NORMAL
UNIT DIVISION, %UDIV: 0
UNIT DIVISION, %UDIV: 0

## 2023-03-31 PROCEDURE — 74011250637 HC RX REV CODE- 250/637: Performed by: OBSTETRICS & GYNECOLOGY

## 2023-03-31 RX ADMIN — IBUPROFEN 800 MG: 400 TABLET ORAL at 01:03

## 2023-03-31 RX ADMIN — IBUPROFEN 800 MG: 400 TABLET ORAL at 09:06

## 2023-03-31 NOTE — LACTATION NOTE
This note was copied from a baby's chart. 23 1310   Visit Information   Lactation Consult Visit Type IP Consult Follow Up   Visit Length 30 minutes   Referral Received From Lactation Consultant Follow-up   Reason for Visit Normal  Visit; Latch Problems;Education   Breast- Feeding Assessment   Breast-Feeding Experience No   Breast Assessment   Left Breast Medium   Left Nipple Everted; Short   Right Breast Medium   Right Nipple Everted; Short   Mother/Infant Observation   Infant Observation   (Chin slight recessed; Submucousal lingual frenulum;  May affect tongue ZACHARY)

## 2023-03-31 NOTE — PROGRESS NOTES
[place]  Depression Screening Prior to Discharge:      EPDS screening completed. I have completed education and provided available resources for postpartum depression to the patient. Patient has verbalized understanding of signs and symptoms to report and all questions answered. Based on EPDS score of   Postpartum Depression: Low Risk     Last EPDS Total Score: 2    Last EPDS Self Harm Result: Never    patient has a scheduled follow-up appointment in 10 days.

## 2023-03-31 NOTE — DISCHARGE SUMMARY
Obstetrical Discharge Summary     Name: Rebecca Adames MRN: [id number]  SSN: [id number]    YOB: 1995  Age: 28 y.o. Sex: female      Admit Date: 3/29/2023    Discharge Date: 3/31/2023     Admitting Physician: Himanshu Ruiz MD     Attending Physician:  Casie Francisco MD     Admission Diagnoses: Term pregnancy [Z34.90]    Discharge Diagnoses:   Information for the patient's : Willy Baires [[id number]]   Delivery of a 3.26 kg male infant via Vaginal, Spontaneous on 3/29/2023 at 7:26 PM  by Christopher Tobar. Apgars were 9  and 9 . Additional Diagnoses:   Hospital Problems  Date Reviewed: 3/29/2023            Codes Class Noted POA    Term pregnancy ICD-10-CM: Z34.90  ICD-9-CM: V22.1  3/29/2023 Unknown          Lab Results   Component Value Date/Time    Rubella, External immune 2022 12:00 AM    GrBStrep, External negative 2023 12:00 AM       Hospital Course: Normal hospital course following the delivery. Patient Instructions:   Current Discharge Medication List        CONTINUE these medications which have NOT CHANGED    Details   PNV Comb #2-Iron-FA-Omega 3 29-1-400 mg cmpk Take  by mouth. Disposition at Discharge: Home or self care    Condition at Discharge: Stable    Reference my discharge instructions.     Follow-up Appointments   Procedures    FOLLOW UP VISIT Appointment in: Ten Days     Standing Status:   Standing     Number of Occurrences:   1     Order Specific Question:   Appointment in     Answer:   Ten Days        Signed By:  Vannesa Branch MD     2023

## 2023-03-31 NOTE — PROGRESS NOTES
Patient discharged. Discharge instructions and medications reviewed/given. Patient verbalizes understanding. All questions answered. No distress noted, patient stable. Signed copy of discharge instructions placed on paper chart. Patient confirmed will call to make appointment in 10 days with Dr. Stephanie Vasquez.

## 2023-03-31 NOTE — PROGRESS NOTES
Post-Partum Day Number 2 Progress Note    Tatianna Barnett     Assessment: Doing well, post partum day 2. HTN but did not need meds during pregnancy. Bp mild range 140s/80-90. Has a bp cuff at home. Minimal LE edema    Plan:   - Discharge home today  - Follow up in office in 1 week(s) with [facility].  - Pain medication prescription declined. - Questions answered. --Pt will monitor bp at home. Call if 150/100 and we will start meds. Information for the patient's : Abdi Torres [[id number]]   Vaginal, Spontaneous  Patient doing well without significant complaint. Voiding without difficulty, normal lochia. Ready for discharge home. Vitals:  Visit Vitals  BP (!) 144/89 (BP 1 Location: Right upper arm, BP Patient Position: Sitting)   Pulse 99   Temp 98.2 °F (36.8 °C)   Resp 18   Ht 5' 6\" (1.676 m)   Wt 98.4 kg (217 lb)   SpO2 99%   Breastfeeding Unknown   BMI 35.02 kg/m²     Temp (24hrs), Av.3 °F (36.8 °C), Min:98.1 °F (36.7 °C), Max:98.6 °F (37 °C)      Exam:        Patient without distress. Fundus firm, nontender per nursing fundal checks                Perineum with normal lochia noted per nursing assessment                Lower extremities are negative for pathological edema    Labs:     Lab Results   Component Value Date/Time    WBC 12.9 (H) 2023 10:28 AM    HGB 11.5 2023 10:28 AM    HCT 35.3 2023 10:28 AM    PLATELET 714 (L)  10:28 AM       No results found for this or any previous visit (from the past 24 hour(s)).

## 2023-03-31 NOTE — DISCHARGE INSTRUCTIONS
POST DELIVERY DISCHARGE INSTRUCTIONS    Name: Zain Arciniega  YOB: 1995  Primary Diagnosis: Active Problems:    Term pregnancy (3/29/2023)        General:     Diet/Diet Restrictions:  Eight 8-ounce glasses of fluid daily (water, juices); avoid excessive caffeine intake. Meals/snacks as desired which are high in fiber and carbohydrates and low in fat and cholesterol. Medications:   {Medication reconciliation information is now added to the patient's AVS automatically when it is printed. There is no need to use this SmartLink in discharge instructions. Highlight this text and delete it to clear this message}      Physical Activity / Restrictions / Safety:     Avoid heavy lifting, no more that 8 lbs. For 2-3 weeks;   Limit use of stairs to 2 times daily for the first week home. No driving for one week. Avoid intercourse 4-6 weeks, no douching or tampon use. Check with obstetrician before starting or resuming an exercise program.      Discharge Instructions/Special Treatment/Home Care Needs:     Continue prenatal vitamins. Continue to use squirt bottle with warm water on your episiotomy after each bathroom use until bleeding stops. If steri-strips applied to your incision, remove in 7-10 days. Call your doctor for the following:     Fever over 101 degrees by mouth. Vaginal bleeding heavier than a normal menstrual period or clots larger than a golf ball. Red streaks or increased swelling of legs, painful red streaks on your breast.  Painful urination, constipation and increased pain or swelling or discharge with your incision. If you feel extremely anxious or overwhelmed. If you have thoughts of harming yourself and/or your baby. Pain Management:     Take Acetaminophen (Tylenol) or Ibuprofen (Advil, Motrin), as directed for pain. Use a warm Sitz bath 3 times daily to relieve episiotomy or hemorrhoidal discomfort.    For hemorrhoidal discomfort, use Tucks and Anusol cream as needed and directed. Heating pad to  incision as needed.      Follow-Up Care:     Appointment with MD: [unfilled]  Telephone number: [phone]    Signed By: Zuleika Sheldon MD                                                                                                   Date: 3/31/2023 Time: 12:20 PM